# Patient Record
Sex: FEMALE | NOT HISPANIC OR LATINO | Employment: PART TIME | URBAN - METROPOLITAN AREA
[De-identification: names, ages, dates, MRNs, and addresses within clinical notes are randomized per-mention and may not be internally consistent; named-entity substitution may affect disease eponyms.]

---

## 2020-05-16 RX ORDER — FUROSEMIDE 40 MG/1
TABLET ORAL
Qty: 30 TABLET | OUTPATIENT
Start: 2020-05-16

## 2022-02-14 ENCOUNTER — OFFICE VISIT (OUTPATIENT)
Dept: OBSTETRICS AND GYNECOLOGY | Facility: CLINIC | Age: 32
End: 2022-02-14
Payer: MEDICAID

## 2022-02-14 VITALS
WEIGHT: 187.38 LBS | DIASTOLIC BLOOD PRESSURE: 86 MMHG | HEIGHT: 71 IN | BODY MASS INDEX: 26.23 KG/M2 | SYSTOLIC BLOOD PRESSURE: 135 MMHG

## 2022-02-14 DIAGNOSIS — Z01.419 WOMEN'S ANNUAL ROUTINE GYNECOLOGICAL EXAMINATION: ICD-10-CM

## 2022-02-14 DIAGNOSIS — N92.0 MENORRHAGIA WITH REGULAR CYCLE: Primary | ICD-10-CM

## 2022-02-14 DIAGNOSIS — E10.9 TYPE 1 DIABETES MELLITUS WITHOUT COMPLICATION: ICD-10-CM

## 2022-02-14 DIAGNOSIS — E03.9 HYPOTHYROIDISM, UNSPECIFIED TYPE: ICD-10-CM

## 2022-02-14 PROCEDURE — 99999 PR PBB SHADOW E&M-EST. PATIENT-LVL III: ICD-10-PCS | Mod: PBBFAC,,, | Performed by: OBSTETRICS & GYNECOLOGY

## 2022-02-14 PROCEDURE — 88141 PR  CYTOPATH CERV/VAG INTERPRET: ICD-10-PCS | Mod: ,,, | Performed by: PATHOLOGY

## 2022-02-14 PROCEDURE — 88175 CYTOPATH C/V AUTO FLUID REDO: CPT | Performed by: PATHOLOGY

## 2022-02-14 PROCEDURE — 3079F PR MOST RECENT DIASTOLIC BLOOD PRESSURE 80-89 MM HG: ICD-10-PCS | Mod: CPTII,,, | Performed by: OBSTETRICS & GYNECOLOGY

## 2022-02-14 PROCEDURE — 1159F PR MEDICATION LIST DOCUMENTED IN MEDICAL RECORD: ICD-10-PCS | Mod: CPTII,,, | Performed by: OBSTETRICS & GYNECOLOGY

## 2022-02-14 PROCEDURE — 3008F BODY MASS INDEX DOCD: CPT | Mod: CPTII,,, | Performed by: OBSTETRICS & GYNECOLOGY

## 2022-02-14 PROCEDURE — 88141 CYTOPATH C/V INTERPRET: CPT | Mod: ,,, | Performed by: PATHOLOGY

## 2022-02-14 PROCEDURE — 87624 HPV HI-RISK TYP POOLED RSLT: CPT | Performed by: OBSTETRICS & GYNECOLOGY

## 2022-02-14 PROCEDURE — 3008F PR BODY MASS INDEX (BMI) DOCUMENTED: ICD-10-PCS | Mod: CPTII,,, | Performed by: OBSTETRICS & GYNECOLOGY

## 2022-02-14 PROCEDURE — 3075F PR MOST RECENT SYSTOLIC BLOOD PRESS GE 130-139MM HG: ICD-10-PCS | Mod: CPTII,,, | Performed by: OBSTETRICS & GYNECOLOGY

## 2022-02-14 PROCEDURE — 99204 OFFICE O/P NEW MOD 45 MIN: CPT | Mod: S$PBB,,, | Performed by: OBSTETRICS & GYNECOLOGY

## 2022-02-14 PROCEDURE — 1159F MED LIST DOCD IN RCRD: CPT | Mod: CPTII,,, | Performed by: OBSTETRICS & GYNECOLOGY

## 2022-02-14 PROCEDURE — 3079F DIAST BP 80-89 MM HG: CPT | Mod: CPTII,,, | Performed by: OBSTETRICS & GYNECOLOGY

## 2022-02-14 PROCEDURE — 99204 PR OFFICE/OUTPT VISIT, NEW, LEVL IV, 45-59 MIN: ICD-10-PCS | Mod: S$PBB,,, | Performed by: OBSTETRICS & GYNECOLOGY

## 2022-02-14 PROCEDURE — 99999 PR PBB SHADOW E&M-EST. PATIENT-LVL III: CPT | Mod: PBBFAC,,, | Performed by: OBSTETRICS & GYNECOLOGY

## 2022-02-14 PROCEDURE — 99213 OFFICE O/P EST LOW 20 MIN: CPT | Mod: PBBFAC,PN | Performed by: OBSTETRICS & GYNECOLOGY

## 2022-02-14 PROCEDURE — 3075F SYST BP GE 130 - 139MM HG: CPT | Mod: CPTII,,, | Performed by: OBSTETRICS & GYNECOLOGY

## 2022-02-14 NOTE — PROGRESS NOTES
Chief Complaint   Patient presents with    Hysterectomy consult    Menstrual issues       History of Present Illness   31 y.o.  female  female  patient presents today for heavy menses monthly without intramenstrual bleeding. Heavy painful cycles > 10 years, vasectomy for Birth control, clots and flooding x 8-9 days per month, no better on oral contraceptive pills trials and Depo-Provera - cannot tolerate IUD. H/o  x 2. Interested in definitive surgery.       Past medical and surgical history reviewed.   I have reviewed the patient's medical history in detail and updated the computerized patient record.    Review of patient's allergies indicates:   Allergen Reactions    Demerol (pf) [meperidine (pf)] Rash    Kiwi (actinidia chinensis) Anaphylaxis    Tramadol Other (See Comments)     seizures       OB History    No obstetric history on file.         Past Medical History:   Diagnosis Date    Cancer     thyroid    Diabetes mellitus     type 1    Hypertension     Thyroid disease        Past Surgical History:   Procedure Laterality Date    APPENDECTOMY      CHEST TUBE INSERTION Left     CHOLECYSTECTOMY      kidney stone removal      THYROIDECTOMY  2019    TONSILLECTOMY         Current Outpatient Medications on File Prior to Visit   Medication Sig Dispense Refill    amoxicillin (AMOXIL) 875 MG tablet Take 1 tablet (875 mg total) by mouth 2 (two) times daily. 14 tablet 0    HYDROcodone-acetaminophen (NORCO) 5-325 mg per tablet Take 1 tablet by mouth every 6 (six) hours as needed. 10 tablet 0    ondansetron (ZOFRAN-ODT) 4 MG TbDL Take 1 tablet (4 mg total) by mouth every 6 (six) hours as needed. 10 tablet 0     No current facility-administered medications on file prior to visit.       Review of patient's allergies indicates:   Allergen Reactions    Demerol (pf) [meperidine (pf)] Rash    Kiwi (actinidia chinensis) Anaphylaxis    Tramadol Other (See Comments)      "seizures       Social History     Socioeconomic History    Marital status: Single   Tobacco Use    Smoking status: Current Every Day Smoker     Packs/day: 0.50    Smokeless tobacco: Never Used   Substance and Sexual Activity    Alcohol use: Never    Drug use: Never    Sexual activity: Yes     Partners: Male       History reviewed. No pertinent family history.        Review of System:   General: no chills, fever, night sweats, weight gain or weight loss  Psychological: no depression or suicidal ideation  Breasts: no new or changing breast lumps, nipple discharge or masses.  Respiratory: no cough, shortness of breath, or wheezing  Cardiovascular: no chest pain or dyspnea on exertion  Gastrointestinal: no abdominal pain, change in bowel habits, or black or bloody stools  Genito-Urinary: no incontinence, urinary frequency/urgency or vulvar/vaginal symptoms, in intramenstrual vaginal bleeding.  Musculoskeletal: no gait disturbance or muscular weakness            Physical Examination:  /86   Ht 5' 11" (1.803 m)   Wt 85 kg (187 lb 6.3 oz)   BMI 26.14 kg/m²    Constitutional: She appears alert and responsive. She appears well-developed, well-groomed, and well-nourished. No distress. Normal weight  HENT: poor dentition  Head: Normocephalic and atraumatic.   Eyes: Conjunctivae and EOM are normal. No scleral icterus.   Neck: Symmetrical. Normal range of motion. Neck supple. No tracheal deviation present. THYROID:  without masses or tenderness.  Cardiovascular: Normal rate, no rhythm abnormality noted. Extremities without swelling or edema, warm.    Pulmonary/Chest: Normal respiratory Effort. No distress or retractions. She exhibits no tenderness.  Abdominal: Soft. She exhibits no distension, hernias or masses. There is no tenderness. No enlargement of liver edge or spleen.  There is no rebound and no guarding.   Genitourinary:    External rectal exam shows no thrombosed external hemorrhoids, no lesions.     " Pelvic exam was performed with patient supine.   No labial fusion, and symmetrical.    There is no rash, lesion or injury on the right labia.   There is no rash, lesion or injury on the left labia.   No bleeding and no signs of injury around the vaginal introitus, urethral meatus is normal size and without prolapse or lesions, urethra well supported. The cervix is visualized with no discharge, lesions or friability.   No vaginal discharge found.   No significant Cystocele, Enterocele or rectocele, and cervix and uterus well supported.   Bimanual exam:   The urethra is normal to palpation and there are no palpable vaginal wall masses.   Uterus is not deviated, not enlarged, not fixed, normal shape and not tender.   Cervix exhibits no motion tenderness.    Right adnexum displays no mass or nodularity and no tenderness.   Left adnexum displays no mass or nodularity and no tenderness.  Musculoskeletal: Normal range of motion.   Neurological: She is alert and oriented to person, place, and time. Coordination normal.   Skin: Skin is warm and dry. She is not diaphoretic. No rashes, lesions or ulcers.   Psychiatric: She has a normal mood and affect, oriented to person, place, and time.          Assessment:  Type-1 diabetes, hypothyroid  Menorrhagia and dysmenorrhea failed mendical mgmt  Clots and flooding    Plan:  PAP, u/s today  Karl sterilization papers signed today  Plan Total laparoscopic Hysterectomy in 1 month when papers matured, with ovarian conservation if possible - after March 15/2022     Patient informed will be contacted with results within 2 weeks. Encouraged to please call back or email if she has not heard from us by then.

## 2022-02-15 ENCOUNTER — HOSPITAL ENCOUNTER (OUTPATIENT)
Dept: RADIOLOGY | Facility: HOSPITAL | Age: 32
Discharge: HOME | End: 2022-02-15
Attending: OBSTETRICS & GYNECOLOGY
Payer: MEDICAID

## 2022-02-15 DIAGNOSIS — N94.6 DYSMENORRHEA: ICD-10-CM

## 2022-02-15 DIAGNOSIS — N92.1 MENORRHAGIA WITH IRREGULAR CYCLE: Primary | ICD-10-CM

## 2022-02-15 DIAGNOSIS — N92.0 MENORRHAGIA WITH REGULAR CYCLE: ICD-10-CM

## 2022-02-15 PROCEDURE — 76856 US EXAM PELVIC COMPLETE: CPT | Mod: 26,,, | Performed by: RADIOLOGY

## 2022-02-15 PROCEDURE — 76830 TRANSVAGINAL US NON-OB: CPT | Mod: TC,PN

## 2022-02-15 PROCEDURE — 76830 TRANSVAGINAL US NON-OB: CPT | Mod: 26,,, | Performed by: RADIOLOGY

## 2022-02-15 PROCEDURE — 76856 US PELVIS COMP WITH TRANSVAG NON-OB (XPD): ICD-10-PCS | Mod: 26,,, | Performed by: RADIOLOGY

## 2022-02-15 PROCEDURE — 76830 US PELVIS COMP WITH TRANSVAG NON-OB (XPD): ICD-10-PCS | Mod: 26,,, | Performed by: RADIOLOGY

## 2022-02-16 ENCOUNTER — PATIENT MESSAGE (OUTPATIENT)
Dept: OBSTETRICS AND GYNECOLOGY | Facility: CLINIC | Age: 32
End: 2022-02-16
Payer: MEDICAID

## 2022-02-18 LAB
HPV HR 12 DNA SPEC QL NAA+PROBE: POSITIVE
HPV16 AG SPEC QL: NEGATIVE
HPV18 DNA SPEC QL NAA+PROBE: NEGATIVE

## 2022-02-21 ENCOUNTER — PATIENT MESSAGE (OUTPATIENT)
Dept: OBSTETRICS AND GYNECOLOGY | Facility: CLINIC | Age: 32
End: 2022-02-21
Payer: MEDICAID

## 2022-02-21 LAB
FINAL PATHOLOGIC DIAGNOSIS: NORMAL
Lab: NORMAL

## 2022-02-21 RX ORDER — METRONIDAZOLE 7.5 MG/G
1 GEL VAGINAL NIGHTLY
Qty: 70 G | Refills: 3 | Status: SHIPPED | OUTPATIENT
Start: 2022-02-21 | End: 2022-02-26

## 2022-02-21 NOTE — PROGRESS NOTES
The results of your most recent Pap smear are normal. This means that no cancerous or precancerous cells were seen. We recommend that you follow up for repeat PAP screening as scheduled.   Positive bacterial vaginosis - Prescription sent in to pharmacy

## 2022-03-03 ENCOUNTER — TELEPHONE (OUTPATIENT)
Dept: OBSTETRICS AND GYNECOLOGY | Facility: CLINIC | Age: 32
End: 2022-03-03
Payer: MEDICAID

## 2022-03-14 ENCOUNTER — OFFICE VISIT (OUTPATIENT)
Dept: OBSTETRICS AND GYNECOLOGY | Facility: CLINIC | Age: 32
End: 2022-03-14
Payer: MEDICAID

## 2022-03-14 DIAGNOSIS — N92.0 MENORRHAGIA: ICD-10-CM

## 2022-03-14 DIAGNOSIS — N92.0 MENORRHAGIA WITH REGULAR CYCLE: Primary | ICD-10-CM

## 2022-03-14 PROCEDURE — 99499 NO LOS: ICD-10-PCS | Mod: S$PBB,,, | Performed by: OBSTETRICS & GYNECOLOGY

## 2022-03-14 PROCEDURE — 99499 UNLISTED E&M SERVICE: CPT | Mod: S$PBB,,, | Performed by: OBSTETRICS & GYNECOLOGY

## 2022-03-14 RX ORDER — MUPIROCIN 20 MG/G
OINTMENT TOPICAL
Status: CANCELLED | OUTPATIENT
Start: 2022-03-14

## 2022-03-14 RX ORDER — SODIUM CHLORIDE 9 MG/ML
INJECTION, SOLUTION INTRAVENOUS CONTINUOUS
Status: CANCELLED | OUTPATIENT
Start: 2022-03-14

## 2022-03-16 ENCOUNTER — TELEPHONE (OUTPATIENT)
Dept: OBSTETRICS AND GYNECOLOGY | Facility: CLINIC | Age: 32
End: 2022-03-16
Payer: MEDICAID

## 2022-03-16 RX ORDER — METRONIDAZOLE 7.5 MG/G
1 GEL VAGINAL NIGHTLY
Qty: 70 G | Refills: 3 | Status: ON HOLD | OUTPATIENT
Start: 2022-03-16 | End: 2022-03-18

## 2022-03-17 ENCOUNTER — TELEPHONE (OUTPATIENT)
Dept: OBSTETRICS AND GYNECOLOGY | Facility: CLINIC | Age: 32
End: 2022-03-17
Payer: MEDICAID

## 2022-03-17 NOTE — TELEPHONE ENCOUNTER
Spoke with pt. Patient got no call about surgery getting canceled. Wants to know why surgery was canceled because of glucose levels. Pt. Was rude and hung up phone and would like callback from the doctor, please advise.

## 2022-03-18 RX ORDER — METRONIDAZOLE 500 MG/1
500 TABLET ORAL 2 TIMES DAILY
Qty: 14 TABLET | Refills: 0 | Status: SHIPPED | OUTPATIENT
Start: 2022-03-18 | End: 2022-03-23

## 2022-03-18 NOTE — TELEPHONE ENCOUNTER
Patients insurance does not cover generic name for metrogel and brand name is not avaliable. Please advise

## 2022-03-23 ENCOUNTER — TELEPHONE (OUTPATIENT)
Dept: OBSTETRICS AND GYNECOLOGY | Facility: CLINIC | Age: 32
End: 2022-03-23
Payer: MEDICAID

## 2022-03-23 RX ORDER — METRONIDAZOLE 65 MG/5G
1 GEL TOPICAL ONCE
Qty: 5 G | Refills: 0 | Status: SHIPPED | OUTPATIENT
Start: 2022-03-23 | End: 2022-03-23

## 2022-03-23 NOTE — TELEPHONE ENCOUNTER
Insurance only covers 1% strength of metrogel vaginal gel, can this be changed from the 0.75% strength and resent?

## 2022-07-13 ENCOUNTER — TELEPHONE (OUTPATIENT)
Dept: PODIATRY | Facility: CLINIC | Age: 32
End: 2022-07-13
Payer: MEDICAID

## 2022-10-24 ENCOUNTER — TELEPHONE (OUTPATIENT)
Dept: OBSTETRICS AND GYNECOLOGY | Facility: CLINIC | Age: 32
End: 2022-10-24
Payer: MEDICAID

## 2022-10-24 NOTE — TELEPHONE ENCOUNTER
----- Message from Tracy Merino sent at 10/24/2022  3:19 PM CDT -----  Contact: patient  Type:  Sooner Appointment Request    Caller is requesting a sooner appointment.  Caller declined first available appointment listed below.  Caller will not accept being placed on the waitlist and is requesting a message be sent to doctor.    Name of Caller:  patient  When is the first available appointment?  12/14  Symptoms:  heavy bleeding, pain  Best Call Back Number:  [phone]  Additional Information:

## 2023-02-02 PROBLEM — E89.0 S/P THYROIDECTOMY: Status: ACTIVE | Noted: 2019-11-22

## 2023-02-02 PROBLEM — E04.2 MULTIPLE THYROID NODULES: Status: ACTIVE | Noted: 2018-10-18

## 2023-02-02 PROBLEM — R56.9 SEIZURE: Status: ACTIVE | Noted: 2023-02-02

## 2023-02-02 PROBLEM — I10 HTN (HYPERTENSION): Status: ACTIVE | Noted: 2023-02-02

## 2023-02-02 PROBLEM — Z90.89 S/P THYROIDECTOMY: Status: ACTIVE | Noted: 2019-11-22

## 2023-02-02 PROBLEM — E04.1 THYROID NODULE: Status: ACTIVE | Noted: 2019-08-15

## 2023-02-02 PROBLEM — E10.9 TYPE 1 DIABETES MELLITUS: Status: ACTIVE | Noted: 2023-02-02

## 2023-02-02 PROBLEM — Z98.890 S/P THYROIDECTOMY: Status: ACTIVE | Noted: 2019-11-22

## 2023-06-30 ENCOUNTER — TELEPHONE (OUTPATIENT)
Dept: OBSTETRICS AND GYNECOLOGY | Facility: CLINIC | Age: 33
End: 2023-06-30
Payer: MEDICAID

## 2023-06-30 NOTE — TELEPHONE ENCOUNTER
----- Message from Yoselyn Haney sent at 6/30/2023  2:26 PM CDT -----  Contact: self  Type: Needs Medical Advice  Who Called:  pt  Best Call Back Number: [facility] [phone]  Additional Information: pt called and ask if you could fax her medical record to [facility] she has an appt on Monday there.please call them for fax number.

## 2024-10-15 ENCOUNTER — TELEPHONE (OUTPATIENT)
Dept: TRANSPLANT | Facility: CLINIC | Age: 34
End: 2024-10-15
Payer: MEDICAID

## 2024-10-15 NOTE — TELEPHONE ENCOUNTER
----- Message from [employer] sent at 10/15/2024 12:32 PM CDT -----    Hepatology referral received and scanned into media; pt chart sent to referral nurse for medical review.     Recs also in Care Everywhere.    Referring Provider: Samir Artis MD  Phone: [phone]  Fax: [phone]  .

## 2024-10-16 ENCOUNTER — TELEPHONE (OUTPATIENT)
Dept: TRANSPLANT | Facility: CLINIC | Age: 34
End: 2024-10-16
Payer: MEDICAID

## 2024-10-16 NOTE — TELEPHONE ENCOUNTER
Referral received from . Initial  referral from Referring Provider: Samir Artis MD  Phone: [phone]  Fax: [phone]  Patient with cirrhosis unknown  MELD 13  ICD-10:  K74.60  Referred for liver transplant for CONSULT   Referral completed and forwarded to [facility].          Insurance:   PRIMARY: EPIC

## 2024-10-21 ENCOUNTER — TELEPHONE (OUTPATIENT)
Dept: TRANSPLANT | Facility: CLINIC | Age: 34
End: 2024-10-21
Payer: MEDICAID

## 2024-10-21 NOTE — TELEPHONE ENCOUNTER
"----- Message from Teion sent at 10/21/2024 12:48 PM CDT -----    Called pt x3 at [phone] (Mobile), but a messages palys "the caller is not accepting calls at this time" and unable to LVM.    Called [phone] (Home Phone) but a messages plays: " the call could not be completed as dialed' and unable to LVM.  .  "

## 2024-12-02 ENCOUNTER — LAB VISIT (OUTPATIENT)
Dept: LAB | Facility: HOSPITAL | Age: 34
End: 2024-12-02
Payer: MEDICAID

## 2024-12-02 ENCOUNTER — OFFICE VISIT (OUTPATIENT)
Dept: TRANSPLANT | Facility: CLINIC | Age: 34
End: 2024-12-02
Payer: MEDICAID

## 2024-12-02 VITALS
WEIGHT: 171.94 LBS | DIASTOLIC BLOOD PRESSURE: 85 MMHG | OXYGEN SATURATION: 96 % | BODY MASS INDEX: 23.29 KG/M2 | TEMPERATURE: 97 F | HEART RATE: 90 BPM | RESPIRATION RATE: 18 BRPM | HEIGHT: 72 IN | SYSTOLIC BLOOD PRESSURE: 136 MMHG

## 2024-12-02 DIAGNOSIS — B19.20 DECOMPENSATED CIRRHOSIS RELATED TO HEPATITIS C VIRUS (HCV): Primary | ICD-10-CM

## 2024-12-02 DIAGNOSIS — B19.20 HEPATITIS C VIRUS INFECTION WITHOUT HEPATIC COMA, UNSPECIFIED CHRONICITY: Primary | ICD-10-CM

## 2024-12-02 DIAGNOSIS — B19.20 HEPATITIS C VIRUS INFECTION WITHOUT HEPATIC COMA, UNSPECIFIED CHRONICITY: ICD-10-CM

## 2024-12-02 DIAGNOSIS — Z76.82 ORGAN TRANSPLANT CANDIDATE: ICD-10-CM

## 2024-12-02 DIAGNOSIS — K74.69 DECOMPENSATED CIRRHOSIS RELATED TO HEPATITIS C VIRUS (HCV): Primary | ICD-10-CM

## 2024-12-02 DIAGNOSIS — R18.8 OTHER ASCITES: ICD-10-CM

## 2024-12-02 DIAGNOSIS — B17.10 ACUTE HEPATITIS C VIRUS INFECTION WITHOUT HEPATIC COMA: Primary | ICD-10-CM

## 2024-12-02 LAB
ABO + RH BLD: NORMAL
AFP SERPL-MCNC: 3.6 NG/ML (ref 0–8.4)
ALBUMIN SERPL BCP-MCNC: 2.7 G/DL (ref 3.5–5.2)
ALP SERPL-CCNC: 121 U/L (ref 40–150)
ALT SERPL W/O P-5'-P-CCNC: 22 U/L (ref 10–44)
ANION GAP SERPL CALC-SCNC: 8 MMOL/L (ref 8–16)
AST SERPL-CCNC: 49 U/L (ref 10–40)
BASOPHILS # BLD AUTO: 0.08 K/UL (ref 0–0.2)
BASOPHILS NFR BLD: 1.1 % (ref 0–1.9)
BILIRUB DIRECT SERPL-MCNC: 0.1 MG/DL (ref 0.1–0.3)
BILIRUB SERPL-MCNC: 0.3 MG/DL (ref 0.1–1)
BLD GP AB SCN CELLS X3 SERPL QL: NORMAL
BUN SERPL-MCNC: 11 MG/DL (ref 6–20)
CALCIUM SERPL-MCNC: 8.2 MG/DL (ref 8.7–10.5)
CHLORIDE SERPL-SCNC: 102 MMOL/L (ref 95–110)
CO2 SERPL-SCNC: 24 MMOL/L (ref 23–29)
CREAT SERPL-MCNC: 0.8 MG/DL (ref 0.5–1.4)
DIFFERENTIAL METHOD BLD: ABNORMAL
EOSINOPHIL # BLD AUTO: 0.2 K/UL (ref 0–0.5)
EOSINOPHIL NFR BLD: 2.4 % (ref 0–8)
ERYTHROCYTE [DISTWIDTH] IN BLOOD BY AUTOMATED COUNT: 14.6 % (ref 11.5–14.5)
EST. GFR  (NO RACE VARIABLE): >60 ML/MIN/1.73 M^2
GGT SERPL-CCNC: 186 U/L (ref 8–55)
GLUCOSE SERPL-MCNC: 357 MG/DL (ref 70–110)
HAV IGG SER QL IA: REACTIVE
HBV CORE AB SERPL QL IA: REACTIVE
HBV SURFACE AB SER-ACNC: 19.41 MIU/ML
HBV SURFACE AB SER-ACNC: REACTIVE M[IU]/ML
HBV SURFACE AG SERPL QL IA: NORMAL
HCT VFR BLD AUTO: 35.5 % (ref 37–48.5)
HCV AB SERPL QL IA: REACTIVE
HGB BLD-MCNC: 10.5 G/DL (ref 12–16)
IMM GRANULOCYTES # BLD AUTO: 0.03 K/UL (ref 0–0.04)
IMM GRANULOCYTES NFR BLD AUTO: 0.4 % (ref 0–0.5)
INR PPP: 1 (ref 0.8–1.2)
LYMPHOCYTES # BLD AUTO: 1.3 K/UL (ref 1–4.8)
LYMPHOCYTES NFR BLD: 18 % (ref 18–48)
MCH RBC QN AUTO: 25 PG (ref 27–31)
MCHC RBC AUTO-ENTMCNC: 29.6 G/DL (ref 32–36)
MCV RBC AUTO: 85 FL (ref 82–98)
MONOCYTES # BLD AUTO: 0.5 K/UL (ref 0.3–1)
MONOCYTES NFR BLD: 7 % (ref 4–15)
NEUTROPHILS # BLD AUTO: 5.1 K/UL (ref 1.8–7.7)
NEUTROPHILS NFR BLD: 71.1 % (ref 38–73)
NRBC BLD-RTO: 0 /100 WBC
PLATELET # BLD AUTO: 184 K/UL (ref 150–450)
PMV BLD AUTO: 10.4 FL (ref 9.2–12.9)
POTASSIUM SERPL-SCNC: 4.2 MMOL/L (ref 3.5–5.1)
PROT SERPL-MCNC: 7.4 G/DL (ref 6–8.4)
PROTHROMBIN TIME: 10.9 SEC (ref 9–12.5)
RBC # BLD AUTO: 4.2 M/UL (ref 4–5.4)
SODIUM SERPL-SCNC: 134 MMOL/L (ref 136–145)
SPECIMEN OUTDATE: NORMAL
WBC # BLD AUTO: 7.16 K/UL (ref 3.9–12.7)

## 2024-12-02 PROCEDURE — 3075F SYST BP GE 130 - 139MM HG: CPT | Mod: CPTII,TXP,, | Performed by: STUDENT IN AN ORGANIZED HEALTH CARE EDUCATION/TRAINING PROGRAM

## 2024-12-02 PROCEDURE — 99999 PR PBB SHADOW E&M-EST. PATIENT-LVL V: CPT | Mod: PBBFAC,TXP,, | Performed by: STUDENT IN AN ORGANIZED HEALTH CARE EDUCATION/TRAINING PROGRAM

## 2024-12-02 PROCEDURE — 82105 ALPHA-FETOPROTEIN SERUM: CPT | Mod: TXP | Performed by: STUDENT IN AN ORGANIZED HEALTH CARE EDUCATION/TRAINING PROGRAM

## 2024-12-02 PROCEDURE — 99205 OFFICE O/P NEW HI 60 MIN: CPT | Mod: S$PBB,TXP,, | Performed by: STUDENT IN AN ORGANIZED HEALTH CARE EDUCATION/TRAINING PROGRAM

## 2024-12-02 PROCEDURE — 3046F HEMOGLOBIN A1C LEVEL >9.0%: CPT | Mod: CPTII,TXP,, | Performed by: STUDENT IN AN ORGANIZED HEALTH CARE EDUCATION/TRAINING PROGRAM

## 2024-12-02 PROCEDURE — 82977 ASSAY OF GGT: CPT | Mod: TXP | Performed by: STUDENT IN AN ORGANIZED HEALTH CARE EDUCATION/TRAINING PROGRAM

## 2024-12-02 PROCEDURE — 87340 HEPATITIS B SURFACE AG IA: CPT | Mod: TXP | Performed by: STUDENT IN AN ORGANIZED HEALTH CARE EDUCATION/TRAINING PROGRAM

## 2024-12-02 PROCEDURE — 99215 OFFICE O/P EST HI 40 MIN: CPT | Mod: PBBFAC,25,TXP | Performed by: STUDENT IN AN ORGANIZED HEALTH CARE EDUCATION/TRAINING PROGRAM

## 2024-12-02 PROCEDURE — 82248 BILIRUBIN DIRECT: CPT | Mod: TXP | Performed by: STUDENT IN AN ORGANIZED HEALTH CARE EDUCATION/TRAINING PROGRAM

## 2024-12-02 PROCEDURE — 3008F BODY MASS INDEX DOCD: CPT | Mod: CPTII,TXP,, | Performed by: STUDENT IN AN ORGANIZED HEALTH CARE EDUCATION/TRAINING PROGRAM

## 2024-12-02 PROCEDURE — 86704 HEP B CORE ANTIBODY TOTAL: CPT | Mod: TXP | Performed by: STUDENT IN AN ORGANIZED HEALTH CARE EDUCATION/TRAINING PROGRAM

## 2024-12-02 PROCEDURE — 86706 HEP B SURFACE ANTIBODY: CPT | Mod: TXP | Performed by: STUDENT IN AN ORGANIZED HEALTH CARE EDUCATION/TRAINING PROGRAM

## 2024-12-02 PROCEDURE — 86790 VIRUS ANTIBODY NOS: CPT | Mod: TXP | Performed by: STUDENT IN AN ORGANIZED HEALTH CARE EDUCATION/TRAINING PROGRAM

## 2024-12-02 PROCEDURE — 85025 COMPLETE CBC W/AUTO DIFF WBC: CPT | Mod: TXP | Performed by: STUDENT IN AN ORGANIZED HEALTH CARE EDUCATION/TRAINING PROGRAM

## 2024-12-02 PROCEDURE — 4010F ACE/ARB THERAPY RXD/TAKEN: CPT | Mod: CPTII,TXP,, | Performed by: STUDENT IN AN ORGANIZED HEALTH CARE EDUCATION/TRAINING PROGRAM

## 2024-12-02 PROCEDURE — 86803 HEPATITIS C AB TEST: CPT | Mod: TXP | Performed by: STUDENT IN AN ORGANIZED HEALTH CARE EDUCATION/TRAINING PROGRAM

## 2024-12-02 PROCEDURE — 1160F RVW MEDS BY RX/DR IN RCRD: CPT | Mod: CPTII,TXP,, | Performed by: STUDENT IN AN ORGANIZED HEALTH CARE EDUCATION/TRAINING PROGRAM

## 2024-12-02 PROCEDURE — 1159F MED LIST DOCD IN RCRD: CPT | Mod: CPTII,TXP,, | Performed by: STUDENT IN AN ORGANIZED HEALTH CARE EDUCATION/TRAINING PROGRAM

## 2024-12-02 PROCEDURE — 86900 BLOOD TYPING SEROLOGIC ABO: CPT | Mod: TXP | Performed by: STUDENT IN AN ORGANIZED HEALTH CARE EDUCATION/TRAINING PROGRAM

## 2024-12-02 PROCEDURE — 3066F NEPHROPATHY DOC TX: CPT | Mod: CPTII,TXP,, | Performed by: STUDENT IN AN ORGANIZED HEALTH CARE EDUCATION/TRAINING PROGRAM

## 2024-12-02 PROCEDURE — 80053 COMPREHEN METABOLIC PANEL: CPT | Mod: TXP | Performed by: STUDENT IN AN ORGANIZED HEALTH CARE EDUCATION/TRAINING PROGRAM

## 2024-12-02 PROCEDURE — 85610 PROTHROMBIN TIME: CPT | Mod: TXP | Performed by: STUDENT IN AN ORGANIZED HEALTH CARE EDUCATION/TRAINING PROGRAM

## 2024-12-02 PROCEDURE — 3079F DIAST BP 80-89 MM HG: CPT | Mod: CPTII,TXP,, | Performed by: STUDENT IN AN ORGANIZED HEALTH CARE EDUCATION/TRAINING PROGRAM

## 2024-12-02 PROCEDURE — 3062F POS MACROALBUMINURIA REV: CPT | Mod: CPTII,TXP,, | Performed by: STUDENT IN AN ORGANIZED HEALTH CARE EDUCATION/TRAINING PROGRAM

## 2024-12-02 RX ORDER — SPIRONOLACTONE 100 MG/1
300 TABLET, FILM COATED ORAL DAILY
Qty: 270 TABLET | Refills: 3 | Status: SHIPPED | OUTPATIENT
Start: 2024-12-02 | End: 2025-12-02

## 2024-12-02 NOTE — PROGRESS NOTES
Transplant Hepatology   Transplant Evaluation Consult Note    Referring provider: Dr. Samir Artis  PCP: [facility]    Chief complaint: HCV cirrhosis, transplant evaluation    HPI: Ronald Barreto is a 34 y.o. female who was [facility] for hepatitis C related cirrhosis.     She has reported history of hepatitis C related cirrhosis that was diagnosed earlier this year after she developed ascites.  Limited records are available for review.  She was started on diuretics and underwent paracentesis.  Despite compliance with diuretics, she continues to require frequent paracentesis.  She denies other signs of decompensated cirrhosis including no recent encephalopathy, jaundice, GI bleeding.    She does not drink alcohol and has never been a heavy drinker.  Her father had alcohol-related cirrhosis.  No other known family history of liver disease.    Past Medical History:   Diagnosis Date    Anxiety     Cancer     thyroid    Depression     Diabetes mellitus     Diabetes mellitus     type 1    Disorder of thyroid, unspecified     Essential (primary) hypertension     Fibromyalgia     Hepatitis C     Hypertension     Kidney stone     Lupus     Pneumothorax     Seizure     Seizures     pt states from Tramadol, none since    Thyroid disease     Type 1 diabetes     Unspecified viral hepatitis C without hepatic coma        Past Surgical History:   Procedure Laterality Date    APPENDECTOMY      CHEST TUBE INSERTION Left 2001    CHOLECYSTECTOMY      kidney stone removal  2015    THYROIDECTOMY  11/2019    TONSILLECTOMY         No family history on file.    Social History     Tobacco Use    Smoking status: Every Day     Current packs/day: 1.00     Types: Cigarettes    Smokeless tobacco: Never   Substance Use Topics    Alcohol use: Never    Drug use: Never       Current Outpatient Medications   Medication Sig Dispense Refill    alprazolam (XANAX) 1 MG tablet Take 1 mg by mouth 2 (two)  times daily.      buprenorphine-naloxone 2-0.5 mg (SUBOXONE) 2-0.5 mg Subl Place under the tongue every 8 (eight) hours as needed. 12mg SL film      diphenhydrAMINE-acetaminophen (PERCOGESIC) 12.5-325 mg Tab Take 1 tablet by mouth every 6 (six) hours as needed (Pain). 15 tablet 0    ferrous sulfate 325 (65 FE) MG EC tablet Take 325 mg by mouth 3 (three) times daily with meals.      gabapentin (NEURONTIN) 800 MG tablet Take 800 mg by mouth 3 (three) times daily.      gabapentin (NEURONTIN) 800 MG tablet Take 800 mg by mouth 3 (three) times daily.      hydrocodone-acetaminophen 10-325mg (NORCO)  mg Tab Take 1 tablet by mouth every 4 (four) hours as needed. 10 tablet 0    ibuprofen (ADVIL,MOTRIN) 800 MG tablet Take 1 tablet (800 mg total) by mouth every 8 (eight) hours as needed for Pain. 20 tablet 0    insulin NPH hum/reg insulin hm (HUMULIN 70/30 U-100 INSULIN SUBQ) Inject 40 Units into the skin 2 (two) times a day.      insulin regular 100 unit/mL Inj injection Inject into the skin 3 (three) times daily before meals.      insulin regular 100 unit/mL Inj injection Inject 2 Units into the skin daily as needed for High Blood Sugar. Sliding scale over 200, 2 units 10 mL 0    levothyroxine (SYNTHROID) 125 MCG tablet Take 125 mcg by mouth before breakfast.      metFORMIN (FORTAMET) 1,000 mg 24hr tablet Take 1,000 mg by mouth 2 (two) times daily with meals.      naproxen (NAPROSYN) 500 MG tablet Take 1 tablet (500 mg total) by mouth 2 (two) times daily with meals. 10 tablet 0    ondansetron (ZOFRAN-ODT) 4 MG TbDL Take 1 tablet (4 mg total) by mouth every 6 (six) hours as needed (nausea/vomiting). 12 tablet 0    oxycodone (ROXICODONE) 30 MG Tab Take 30 mg by mouth 4 (four) times daily.       potassium chloride (KLOR-CON) 10 MEQ TbSR Take 10 mEq by mouth once.      promethazine (PHENERGAN) 25 MG suppository Place 1 suppository (25 mg total) rectally every 6 (six) hours as needed for Nausea. 10 suppository 0     terconazole (TERAZOL 7) 0.4 % Crea Place 1 applicator vaginally every evening. 7 g 1    vitamin D (VITAMIN D3) 1000 units Tab Take 1,000 Units by mouth once daily.      furosemide (LASIX) 20 MG tablet Take 1 tablet (20 mg total) by mouth once daily. 10 tablet 0     No current facility-administered medications for this visit.       Review of patient's allergies indicates:   Allergen Reactions    Demerol (pf) [meperidine (pf)] Rash    Kiwi (actinidia chinensis) Anaphylaxis    Kiwi (actinidia chinensis) Anaphylaxis    Toradol [ketorolac]     Tramadol        Review of Systems   Constitutional:  Negative for fever and weight loss.   Cardiovascular:  Negative for leg swelling.   Gastrointestinal:  Negative for abdominal pain, blood in stool, constipation, diarrhea, heartburn, melena, nausea and vomiting.       Vitals:    12/02/24 1428   BP: 136/85   Pulse: 90   Resp: 18   Temp: 97.3 °F (36.3 °C)   TempSrc: Tympanic   SpO2: 96%   Weight: 78 kg (171 lb 15.3 oz)   Height: 6' (1.829 m)       Physical Exam  Constitutional:       General: She is not in acute distress.  Eyes:      General: No scleral icterus.  Cardiovascular:      Rate and Rhythm: Normal rate and regular rhythm.   Pulmonary:      Effort: Pulmonary effort is normal. No respiratory distress.   Abdominal:      General: Bowel sounds are normal. There is distension.      Palpations: Abdomen is soft.      Tenderness: There is no abdominal tenderness. There is no guarding or rebound.   Musculoskeletal:      Right lower leg: No edema.      Left lower leg: No edema.   Skin:     Coloration: Skin is not jaundiced.         LABS: I personally reviewed pertinent laboratory findings.    Lab Results   Component Value Date    ALT 22 12/02/2024    AST 49 (H) 12/02/2024     (H) 12/02/2024    ALKPHOS 121 12/02/2024    BILITOT 0.3 12/02/2024       Lab Results   Component Value Date    WBC 7.16 12/02/2024    HGB 10.5 (L) 12/02/2024    HCT 35.5 (L) 12/02/2024    MCV 85  12/02/2024     12/02/2024       Lab Results   Component Value Date     (L) 12/02/2024    K 4.2 12/02/2024     12/02/2024    CO2 24 12/02/2024    BUN 11 12/02/2024    CREATININE 0.8 12/02/2024    CALCIUM 8.2 (L) 12/02/2024    ANIONGAP 8 12/02/2024    ESTGFRAFRICA >60 05/06/2022    EGFRNONAA >60 05/06/2022       Lab Results   Component Value Date    INR 1.0 12/02/2024       Imaging:  I personally reviewed recent imaging studies available on the chart and from outside medical records.      Assessment:  34 y.o. female with hepatitis C related cirrhosis. She is decompensated with ascites.    1. Decompensated cirrhosis related to hepatitis C virus (HCV)    2. Other ascites        MELD 3.0: 11 at 12/2/2024  1:52 PM  MELD-Na: 6 at 12/2/2024  1:52 PM  Calculated from:  Serum Creatinine: 0.8 mg/dL (Using min of 1 mg/dL) at 12/2/2024  1:52 PM  Serum Sodium: 134 mmol/L at 12/2/2024  1:52 PM  Total Bilirubin: 0.3 mg/dL (Using min of 1 mg/dL) at 12/2/2024  1:52 PM  Serum Albumin: 2.7 g/dL at 12/2/2024  1:52 PM  INR(ratio): 1 at 12/2/2024  1:52 PM  Age at listing (hypothetical): 34 years  Sex: Female at 12/2/2024  1:52 PM        Transplant Candidacy: Patient is a 34 y.o. female with reported hepatitis C related cirrhosis referred for evaluation for possible OLT. MELD-Na 6. HCV viral load previously detected though she reports more recent testing negative in the absence of treatment.  Given diagnostic uncertainty, will hold off on transplant evaluation for now but will consider if MELD and/or decompensations worsen.    Recommendations:  Cirrhosis possibly due to HCV: Complete serologic evaluation for chronic liver disease including HCV PCR.  If unrevealing, will consider liver biopsy with pressure measurements to confirm cirrhosis and portal hypertension.    Ascites/Edema: 2 gm Na diet.  Continue Lasix 80 mg twice daily.  Increase spironolactone to 300 mg daily.  Repeat labs 1-2 weeks.  LVP as needed.  Consider  TIPS if she continues to require paracentesis.    Encephalopathy: Not an active issue    Variceal screening:  She will schedule EGD with her local gastroenterologist.    HCC screening: CT in 09/2024 without HCC. AFP 3.8. Repeat abdominal US and AFP every 6 months.    Immunizations: Recommend HAV and HBV vaccinations if not immune    UNOS Patient Status  Functional Status: 70% - Cares for self: unable to carry on normal activity or active work  Physical Capacity: No Limitations    Diabetes: Type I  Any previous malignancy: No  Neoadjuvant Therapy: no  Has patient ever had a dx of HCC: no  Previous Abdominal Surgery: yes  Spontaneous Bacterial Peritonitis: no  History of Portal Vein Thrombosis: no  Transjugular Intrahepatic Portosystemic Shunt: no    Return to clinic in 3 months.    I have sent communication to the referring physician and/or primary care provider.    I spent a total of 60 minutes on the day of the visit. This includes face to face time and non-face to face time preparing to see the patient (eg, review of tests), obtaining and/or reviewing separately obtained history, documenting clinical information in the electronic or other health record, independently interpreting results, and communicating results to the patient/family/caregiver, or care coordination.    This note includes dictation done using [employer] speech recognition program. Word recognition mistakes are occasionally missed on review.    Juan Jose Blair MD  Staff Physician  Hepatology and Liver Transplant  [facility]  [facility]

## 2024-12-02 NOTE — Clinical Note
Hold off on transplant eval for now. Meld labs 1-2 weeks. Please also check hcv pcr. Return 3 months.

## 2024-12-02 NOTE — LETTER
December 8, 2024                      [facility]  [address]  [facility] [address]  Phone: [phone]   Patient: Ronald Barreto   MR Number: [id number]   YOB: 1990   Date of Visit: 12/2/2024       Dear       Thank you for referring Ronald Barreto to me for evaluation. Attached you will find relevant portions of my assessment and plan of care.    If you have questions, please do not hesitate to call me. I look forward to following Ronald Barreto along with you.    Sincerely,    Juan Jose Blair MD    Enclosure    If you would like to receive this communication electronically, please contact [email or web address] or [phone] to request [employer] access.    [employer] is a tool which provides read-only access to select patient information with whom you have a relationship. Its easy to use and provides real time access to review your patients record including encounter summaries, notes, results, and demographic information.    If you feel you have received this communication in error or would no longer like to receive these types of communications, please e-mail [email or web address]

## 2024-12-21 ENCOUNTER — PATIENT MESSAGE (OUTPATIENT)
Dept: TRANSPLANT | Facility: CLINIC | Age: 34
End: 2024-12-21
Payer: MEDICAID

## 2024-12-24 ENCOUNTER — LAB VISIT (OUTPATIENT)
Dept: LAB | Facility: HOSPITAL | Age: 34
End: 2024-12-24
Attending: STUDENT IN AN ORGANIZED HEALTH CARE EDUCATION/TRAINING PROGRAM
Payer: MEDICAID

## 2024-12-24 DIAGNOSIS — K74.69 DECOMPENSATED CIRRHOSIS RELATED TO HEPATITIS C VIRUS (HCV): ICD-10-CM

## 2024-12-24 DIAGNOSIS — B17.10 ACUTE HEPATITIS C VIRUS INFECTION WITHOUT HEPATIC COMA: ICD-10-CM

## 2024-12-24 DIAGNOSIS — B19.20 DECOMPENSATED CIRRHOSIS RELATED TO HEPATITIS C VIRUS (HCV): ICD-10-CM

## 2024-12-24 DIAGNOSIS — R18.8 OTHER ASCITES: ICD-10-CM

## 2024-12-24 LAB
ALBUMIN SERPL BCP-MCNC: 2.8 G/DL (ref 3.5–5.2)
ALP SERPL-CCNC: 118 U/L (ref 40–150)
ALT SERPL W/O P-5'-P-CCNC: 24 U/L (ref 10–44)
ANION GAP SERPL CALC-SCNC: 9 MMOL/L (ref 8–16)
AST SERPL-CCNC: 35 U/L (ref 10–40)
BASOPHILS # BLD AUTO: 0.05 K/UL (ref 0–0.2)
BASOPHILS NFR BLD: 0.7 % (ref 0–1.9)
BILIRUB SERPL-MCNC: 0.3 MG/DL (ref 0.1–1)
BUN SERPL-MCNC: 6 MG/DL (ref 6–20)
CALCIUM SERPL-MCNC: 8.4 MG/DL (ref 8.7–10.5)
CHLORIDE SERPL-SCNC: 97 MMOL/L (ref 95–110)
CO2 SERPL-SCNC: 25 MMOL/L (ref 23–29)
CREAT SERPL-MCNC: 0.9 MG/DL (ref 0.5–1.4)
DIFFERENTIAL METHOD BLD: ABNORMAL
EOSINOPHIL # BLD AUTO: 0.1 K/UL (ref 0–0.5)
EOSINOPHIL NFR BLD: 1.6 % (ref 0–8)
ERYTHROCYTE [DISTWIDTH] IN BLOOD BY AUTOMATED COUNT: 14.2 % (ref 11.5–14.5)
EST. GFR  (NO RACE VARIABLE): >60 ML/MIN/1.73 M^2
GLUCOSE SERPL-MCNC: 427 MG/DL (ref 70–110)
HCT VFR BLD AUTO: 37.7 % (ref 37–48.5)
HGB BLD-MCNC: 11.7 G/DL (ref 12–16)
IMM GRANULOCYTES # BLD AUTO: 0.03 K/UL (ref 0–0.04)
IMM GRANULOCYTES NFR BLD AUTO: 0.4 % (ref 0–0.5)
INR PPP: 1 (ref 0.8–1.2)
LYMPHOCYTES # BLD AUTO: 1.2 K/UL (ref 1–4.8)
LYMPHOCYTES NFR BLD: 17.7 % (ref 18–48)
MCH RBC QN AUTO: 24.2 PG (ref 27–31)
MCHC RBC AUTO-ENTMCNC: 31 G/DL (ref 32–36)
MCV RBC AUTO: 78 FL (ref 82–98)
MONOCYTES # BLD AUTO: 0.4 K/UL (ref 0.3–1)
MONOCYTES NFR BLD: 5.8 % (ref 4–15)
NEUTROPHILS # BLD AUTO: 5 K/UL (ref 1.8–7.7)
NEUTROPHILS NFR BLD: 73.8 % (ref 38–73)
NRBC BLD-RTO: 0 /100 WBC
PLATELET # BLD AUTO: 169 K/UL (ref 150–450)
PMV BLD AUTO: 9.6 FL (ref 9.2–12.9)
POTASSIUM SERPL-SCNC: 3.4 MMOL/L (ref 3.5–5.1)
PROT SERPL-MCNC: 7.6 G/DL (ref 6–8.4)
PROTHROMBIN TIME: 11.2 SEC (ref 9–12.5)
RBC # BLD AUTO: 4.83 M/UL (ref 4–5.4)
SODIUM SERPL-SCNC: 131 MMOL/L (ref 136–145)
WBC # BLD AUTO: 6.77 K/UL (ref 3.9–12.7)

## 2024-12-24 PROCEDURE — 85025 COMPLETE CBC W/AUTO DIFF WBC: CPT | Mod: PO,TXP | Performed by: STUDENT IN AN ORGANIZED HEALTH CARE EDUCATION/TRAINING PROGRAM

## 2024-12-24 PROCEDURE — 85610 PROTHROMBIN TIME: CPT | Mod: TXP | Performed by: STUDENT IN AN ORGANIZED HEALTH CARE EDUCATION/TRAINING PROGRAM

## 2024-12-24 PROCEDURE — 80053 COMPREHEN METABOLIC PANEL: CPT | Mod: TXP | Performed by: STUDENT IN AN ORGANIZED HEALTH CARE EDUCATION/TRAINING PROGRAM

## 2024-12-24 PROCEDURE — 36415 COLL VENOUS BLD VENIPUNCTURE: CPT | Mod: PO,TXP | Performed by: STUDENT IN AN ORGANIZED HEALTH CARE EDUCATION/TRAINING PROGRAM

## 2024-12-24 PROCEDURE — 87522 HEPATITIS C REVRS TRNSCRPJ: CPT | Mod: TXP | Performed by: STUDENT IN AN ORGANIZED HEALTH CARE EDUCATION/TRAINING PROGRAM

## 2024-12-27 LAB
HCV RNA SERPL NAA+PROBE-LOG IU: 4.57 LOGIU/ML
HCV RNA SERPL QL NAA+PROBE: DETECTED
HCV RNA SPEC NAA+PROBE-ACNC: ABNORMAL IU/ML

## 2025-01-07 ENCOUNTER — TELEPHONE (OUTPATIENT)
Dept: HEPATOLOGY | Facility: CLINIC | Age: 35
End: 2025-01-07
Payer: MEDICAID

## 2025-01-07 ENCOUNTER — PATIENT MESSAGE (OUTPATIENT)
Dept: TRANSPLANT | Facility: CLINIC | Age: 35
End: 2025-01-07
Payer: MEDICAID

## 2025-01-07 DIAGNOSIS — K74.69 DECOMPENSATED CIRRHOSIS RELATED TO HEPATITIS C VIRUS (HCV): ICD-10-CM

## 2025-01-07 DIAGNOSIS — R18.8 OTHER ASCITES: Primary | ICD-10-CM

## 2025-01-07 DIAGNOSIS — B19.20 DECOMPENSATED CIRRHOSIS RELATED TO HEPATITIS C VIRUS (HCV): ICD-10-CM

## 2025-01-07 NOTE — TELEPHONE ENCOUNTER
----- Message from Juan Jose Blair MD sent at 1/7/2025 12:24 PM CST -----  Can she please she Ann for HCV treatment?    Thanks,  Juan Jose

## 2025-01-23 ENCOUNTER — OFFICE VISIT (OUTPATIENT)
Dept: HEPATOLOGY | Facility: CLINIC | Age: 35
End: 2025-01-23
Payer: MEDICAID

## 2025-01-23 ENCOUNTER — PATIENT MESSAGE (OUTPATIENT)
Dept: HEPATOLOGY | Facility: CLINIC | Age: 35
End: 2025-01-23

## 2025-01-23 DIAGNOSIS — B18.2 CHRONIC HEPATITIS C WITHOUT HEPATIC COMA: Primary | ICD-10-CM

## 2025-01-23 DIAGNOSIS — R18.8 OTHER ASCITES: ICD-10-CM

## 2025-01-23 DIAGNOSIS — K74.60 HEPATIC CIRRHOSIS, UNSPECIFIED HEPATIC CIRRHOSIS TYPE, UNSPECIFIED WHETHER ASCITES PRESENT: ICD-10-CM

## 2025-01-23 RX ORDER — PANTOPRAZOLE SODIUM 40 MG/1
40 TABLET, DELAYED RELEASE ORAL DAILY
COMMUNITY

## 2025-01-23 RX ORDER — VALSARTAN 40 MG/1
40 TABLET ORAL DAILY
COMMUNITY

## 2025-01-23 RX ORDER — VELPATASVIR AND SOFOSBUVIR 100; 400 MG/1; MG/1
1 TABLET, FILM COATED ORAL DAILY
Qty: 28 TABLET | Refills: 5 | Status: ACTIVE | OUTPATIENT
Start: 2025-01-23

## 2025-01-23 RX ORDER — INSULIN LISPRO 100 [IU]/ML
INJECTION, SOLUTION INTRAVENOUS; SUBCUTANEOUS
COMMUNITY

## 2025-01-23 RX ORDER — FUROSEMIDE 80 MG/1
80 TABLET ORAL DAILY
COMMUNITY

## 2025-01-23 NOTE — PROGRESS NOTES
HEPATOLOGY VIDEO VISIT NOTE - HCV clinic  Visit type: Audiovisual    Each patient to whom he or she provides medical services by telemedicine is:  (1) informed of the relationship between the physician and patient and the respective role of any other health care provider with respect to management of the patient; and (2) notified that he or she may decline to receive medical services by telemedicine and may withdraw from such care at any time.    REFERRING PROVIDER: Des Blair MD  CHIEF COMPLAINT: Hepatitis C   (accompanied by: Aunt Tere)    HISTORY       This is a 34 y.o. White female with apparent decompensated but low MELD cirrhosis, followed by Dr Blair. Has been struggling w/ ascites requiring multiple paracenteses. TIPS considered but pt referred for HCV rx in hopes that curing HCV will improve ascites to avoid TIPS.     Cirrhosis etiology: ?RIVERA (+HCV?)  - Elevated transaminases since at least 2016.   - Serologic screening for chronic liver disease unyielding other than HCV, however RNA trend is peculiar: very low RNA followed by several neg RNAs (? More recent infxn w/ self clearance) followed by more recent higher RNA titer (? reinfection). It's also possible her prior self clearance attempt was unsuccessful and current infxn represents original virus.  (+) risks for RIVERA: poorly controlled DM, HLD,       HCV history:  Originally diagnosed: 2008 per pt, 11 yrs ago per chart  HCV trend:  12/2023 HCV  (CE)  3/2024 HCVAB positive (CE)  3/2024 HCV RNA neg (CE)  6/2024 HCV RNA neg (CE)  10/2024 HCV RNA neg (CE)    Risks for HCV:  drugs, tattoos    - Prior Treatment: No  - Genotype ?  - HCV RNA 37,452 IU/mL - 12/2024    Cirrhosis history:  Decompensated: Ascites requiring multiple LVP (last 1/13, 4.4L)  (+) portal HTN:     MELD 3.0: 14 at 12/24/2024  9:43 AM  MELD-Na: 6 at 12/24/2024  9:43 AM  Calculated from:  Serum Creatinine: 0.9 mg/dL (Using min of 1 mg/dL) at 12/24/2024  9:43 AM  Serum Sodium:  131 mmol/L at 12/24/2024  9:43 AM  Total Bilirubin: 0.3 mg/dL (Using min of 1 mg/dL) at 12/24/2024  9:43 AM  Serum Albumin: 2.8 g/dL at 12/24/2024  9:43 AM  INR(ratio): 1 at 12/24/2024  9:43 AM  Age at listing (hypothetical): 34 years  Sex: Female at 12/24/2024  9:43 AM    Cirrhosis health maintenance:  - HCC screening: Up to date  - Varices screening:  EGD needed: Dr Blair advised to see local GI  - HAV status: Immunity documented 12/2024  - HBV status: Prior resolved infection: Pos HBsAb, Pos HBcAb (neg HBsAg)    Serology for other chronic liver disease (CE): unyielding  12/2023: MICKY, SMA, AMA neg, Cerul normal  3/2024: Ferritin normal  6/2024: A1AT normal, MICKY, AMA, SMA neg      No jaundice, hematemesis, melena, HE  (+) GERD: on protonix 40mg daily      PMH, PSH, SOCIAL HX, FAMILY HX      Reviewed in Epic  Pertinent findings:  FAMILY HX: Father w/ alcohol related cirrhosis  SOCIAL HX: lives w/ aunt (Chastity)  Alcohol - denies hx of heavy use  Drugs - prior use      ROS: as per HPI    PHYSICAL EXAM:  Friendly White female, in no acute distress; alert and oriented to person, place and time  LUNGS: Normal respiratory effort.  NEURO/PSYCH: Memory intact. Thought and speech pattern appropriate. Behavior normal. No depression or anxiety noted.    PERTINENT DIAGNOSTIC RESULTS      Labs 1/13/25  WBC 4.4 - 11.2 10*3/uL 5.7   HGB 12.0 - 16.0 g/dL 8.6 Low    HCT 37.0 - 47.0 % 28.3 Low    Platelet Count 130 - 375 10*3/uL 177     Lab Results   Component Value Date    INR 1.0 12/24/2024     Lab Results   Component Value Date    AST 51 (H) 01/14/2025    ALT 16 01/14/2025    BILITOT 0.2 (L) 01/14/2025    ALBUMIN 2 (L) 01/14/2025    ALKPHOS 100 01/14/2025    CREATININE 0.71 01/14/2025    BUN 12 01/14/2025     (L) 01/14/2025    K 4.3 01/14/2025    AFP 3.6 12/02/2024     HIV 3/2024 (CE): neg    CT Three Phase Liver Protocol - 1/9/25  REASON FOR EXAM: HCV with cirrhosis    TECHNICAL FACTORS: Multiple contiguous axial CT  images were obtained of the abdomen at the level of the liver before and after administration of intravenous contrast. Contrast-enhanced images were obtained arterial phase, portal venous phase and delayed  phase following injection. Automated exposure control was utilized for radiation dose reduction.    COMPARISON: 11/23/2024    FINDINGS:  Soft tissues/Musculature: Body wall edema.  Osseous Structures: Unremarkable  Lung bases: Mild bibasilar atelectasis. Small pericardial effusion.  Aorta/Vasculature: Moderate atherosclerotic disease.  Lymph nodes: Unremarkable  Liver: Hepatomegaly.  Gallbladder/Biliary System: Status post cholecystectomy  Pancreas: Mild pancreatic atrophy  Spleen: Splenomegaly.  Adrenal glands: Unremarkable  Kidneys: Unremarkable  Peritoneum/Mesentery: Mesenteric fat stranding and trace peritoneal free fluid.  Bowel/Stomach: Mild diffuse colonic wall thickening.    IMPRESSION:   1.  No focal liver lesion.   2.  Hepatosplenomegaly.  3. Mesenteric fat stranding and small ascites.  4. Diffuse colonic wall thickening, possibly portal colopathy or other colitis.  5. Additional incidental, age-related, and/or chronic findings, as described above.    ASSESSMENT        34 y.o. White female with:  1. Chronic HCV, genotype  ?  - treatment naive  -- Elevated transaminases  -- HAV status - Immunity documented 12/2024  -- HBV status - Prior resolved infection: Pos HBsAb, Pos HBcAb (neg HBsAg)    2. Cirrhosis: decompensated but low MELD - care deferred to Dr Blair  -- MELD-Na: 6, MELD 3.0: 14  -- HCC screening - up to date 1/2025  -- Ascites - requiring diuretics and multiple paracentesis  *Hoping HCV cure can prevent TIPS    3. Portal hypertension  -- EGD needed, pt already advised to see local GI    4. Anemia  -- precludes ribavirin    5. GERD: on protonix      PLAN        Epclusa x 24 weeks sent to [facility]  -- Patient to notify me of Rx start date so labs can be scheduled.  While on  epclusa: reduce protonix to 20mg, dosed per below (She reports already having a script for this on file)  Low Na diet, 2000mg  Routine cirrhosis care / ascites mngmt: defer to Dr Blair      ___________________________________________________________________  EDUCATION:  The natural history of Hepatitis C, including potential progression to cirrhosis was reviewed. We discussed the increased progression of liver disease secondary to alcohol use; patient was advised to avoid alcohol completely.     Transmission of Hepatitis C was reviewed, including possible sexual transmission. Sexual contacts should be screened.   Risk of vertical transmission of Hepatitis C from mother to baby was reviewed.   Patient should avoid sharing personal products such as razors, toothbrushes, etc.     HCV RX  Discussed goal of HCV eradication to prevent progression of liver disease.  Discussed use of Epclusa daily x 24 weeks w/ potential side effects of fatigue and headache.     Reviewed limitations on acid suppressant medications due to DDI w/ Epclusa:  -- Antacids - not taking  -- H2 Receptor Antagonist - not taking  -- PPI - protonix 20mg, dosed 4 hours after Epclusa is taken with food  Patient instructed to contact me if experiencing acid related symptoms so further recommendations can be made regarding acid suppression therapy.      Herbal / alternative therapies must be discontinued  Discussed importance of medication adherence and risk of treatment failure / viral resistance if not adherent. Pt has verbalized understanding.      __________________________________________________________________    Duration of encounter: 62 min  This includes face-to-face time and non face-to-face time preparing to see the patient (eg, review of tests), obtaining and/or reviewing separately obtained history, documenting clinical information in the electronic or other health record, independently interpreting resultsand communicating results to the  patient/family/caregiver, or care coordination.

## 2025-01-31 ENCOUNTER — TELEPHONE (OUTPATIENT)
Dept: HEPATOLOGY | Facility: CLINIC | Age: 35
End: 2025-01-31
Payer: MEDICAID

## 2025-01-31 DIAGNOSIS — K74.60 HEPATIC CIRRHOSIS, UNSPECIFIED HEPATIC CIRRHOSIS TYPE, UNSPECIFIED WHETHER ASCITES PRESENT: Primary | ICD-10-CM

## 2025-01-31 NOTE — TELEPHONE ENCOUNTER
Pt beginning 24 weeks Epclusa on 2/1/25  Anticipated treatment end date: 7/18/25  F 4 decompensated  Annalise ?  Prior HCV treatment: No    Pls update episode and schedule:  - CMP, HCV RNA at week 6 -   - CMP and VISIT in late May -  - CBC, CMP, INR, AFP, U/S - 7/2025  - CMP, HCV RNA - SVR12 - 10/17/25

## 2025-02-03 DIAGNOSIS — B18.2 CHRONIC HEPATITIS C WITHOUT HEPATIC COMA: Primary | ICD-10-CM

## 2025-02-03 NOTE — TELEPHONE ENCOUNTER
I spoke with patient and msg from PA Scheuermann relayed and mailed to her.  She states that she will actually start therapy on 2/4/25.  Episode info adjusted.  F/u with testing scheduled as ordered and appt reminder notices mailed.

## 2025-02-06 ENCOUNTER — PATIENT MESSAGE (OUTPATIENT)
Dept: HEPATOLOGY | Facility: CLINIC | Age: 35
End: 2025-02-06
Payer: MEDICAID

## 2025-02-28 ENCOUNTER — PATIENT MESSAGE (OUTPATIENT)
Dept: TRANSPLANT | Facility: CLINIC | Age: 35
End: 2025-02-28
Payer: MEDICAID

## 2025-03-11 ENCOUNTER — TELEPHONE (OUTPATIENT)
Facility: CLINIC | Age: 35
End: 2025-03-11
Payer: MEDICAID

## 2025-03-11 NOTE — TELEPHONE ENCOUNTER
[facility] attempting to reach patient to setup 2nd shipment of Epclusa.  I spoke with patient.  She is currently inpatient  at [facility].  She states that she will contact pharmacy ASAP to setup next shipment and have a family member to bring med to the hospital.

## 2025-03-18 ENCOUNTER — TELEPHONE (OUTPATIENT)
Dept: HEPATOLOGY | Facility: CLINIC | Age: 35
End: 2025-03-18
Payer: MEDICAID

## 2025-03-18 NOTE — TELEPHONE ENCOUNTER
I spoke with patient and msg from PA Scheuermann relayed.  She reports missing > than 1 wk of med but could not provide an exact stop date.  She states that she will restart Epclusa today.  Quant scheduled 3/19/25.  SVR draw moved out 2 wks to 11/4/25 and appt reminder notice mailed.  Only slots available with Dr. Blair are in May/June.  Patient needs a hospital discharge slot.  I will send msg from manager hoping for an overbook.

## 2025-03-18 NOTE — TELEPHONE ENCOUNTER
I spoke with patient.  She reports being off of Epclusa X 1 week and being told by hospital MD to remain off med.  Please advise.

## 2025-03-18 NOTE — TELEPHONE ENCOUNTER
----- Message from Argelia sent at 3/18/2025 10:16 AM CDT -----  Regarding: missed call?  Contact: Patient can be contacted @# [phone]  PT called returning missed call. Please reach out to patient at your earliest conveniencePatient can be contacted @# [phone]

## 2025-04-16 ENCOUNTER — TELEPHONE (OUTPATIENT)
Dept: HEPATOLOGY | Facility: CLINIC | Age: 35
End: 2025-04-16
Payer: MEDICAID

## 2025-04-16 NOTE — TELEPHONE ENCOUNTER
I last spoke with patient on 3/24/25.   She was moving to [place] that night.  I was told that she would have her sister to get her next shipment of Epclusa and send med to her there.  Attempt made to reach patient today at both numbers listed to see if she's established care in [place] and to check on her hep c med use.  Unable to leave a .  I don't have a forwarding address for her yet.

## 2025-05-06 ENCOUNTER — PATIENT MESSAGE (OUTPATIENT)
Dept: TRANSPLANT | Facility: CLINIC | Age: 35
End: 2025-05-06
Payer: MEDICAID

## 2025-05-08 ENCOUNTER — TELEPHONE (OUTPATIENT)
Dept: HEPATOLOGY | Facility: CLINIC | Age: 35
End: 2025-05-08
Payer: MEDICAID

## 2025-05-08 NOTE — TELEPHONE ENCOUNTER
----- Message from ELIZA Pérez sent at 5/8/2025  1:28 PM CDT -----  Patient not responding to [facility], clinic or transplant.  Per last contact she was moving out of state.  Okay to close hep c episode?

## 2025-05-20 ENCOUNTER — TELEPHONE (OUTPATIENT)
Dept: TRANSPLANT | Facility: CLINIC | Age: 35
End: 2025-05-20
Payer: MEDICAID

## 2025-05-20 NOTE — TELEPHONE ENCOUNTER
Multiple attempts made to reach patient by phone regarding f/u.  Unable to reach at numbers listed in Epic and unable to leave message.  Also sent message via [facility] portal, but have not received a reply.  Per Hepatology clinic, patient had plans to move out of town, but unable to confirm w/ patient.  Letter sent to patient, requesting a call back to discuss above.  Will close episode if no reply in 30 days.

## 2025-06-25 ENCOUNTER — TELEPHONE (OUTPATIENT)
Dept: OBSTETRICS AND GYNECOLOGY | Facility: CLINIC | Age: 35
End: 2025-06-25
Payer: MEDICAID

## 2025-06-25 NOTE — TELEPHONE ENCOUNTER
Copied from CRM #[id number]. Topic: General Inquiry - Patient Advice  >> Jun 25, 2025  8:34 AM Maribel wrote:  Type:  Needs Medical Advice    Who Called: patient  Would the patient rather a call back or a response via [facility]? call  Best Call Back Number: [phone]   Additional Information: Patient needs a copy of her records faxed to [phone] attn: Dr Antonieta Colindres. She moved out of state. Please call